# Patient Record
Sex: FEMALE | Race: WHITE | NOT HISPANIC OR LATINO | Employment: STUDENT | URBAN - METROPOLITAN AREA
[De-identification: names, ages, dates, MRNs, and addresses within clinical notes are randomized per-mention and may not be internally consistent; named-entity substitution may affect disease eponyms.]

---
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
## 2025-05-15 ENCOUNTER — OFFICE VISIT (OUTPATIENT)
Dept: OBSTETRICS AND GYNECOLOGY | Facility: CLINIC | Age: 20
End: 2025-05-15
Payer: COMMERCIAL

## 2025-05-15 VITALS — BODY MASS INDEX: 22.53 KG/M2 | WEIGHT: 131.94 LBS | HEIGHT: 64 IN

## 2025-05-15 DIAGNOSIS — N92.0 MENORRHAGIA WITH REGULAR CYCLE: Primary | ICD-10-CM

## 2025-05-15 DIAGNOSIS — Z30.018 ENCOUNTER FOR INITIAL PRESCRIPTION OF OTHER CONTRACEPTIVES: ICD-10-CM

## 2025-05-15 PROCEDURE — 99213 OFFICE O/P EST LOW 20 MIN: CPT | Mod: S$GLB,,,

## 2025-05-15 PROCEDURE — 1159F MED LIST DOCD IN RCRD: CPT | Mod: CPTII,S$GLB,,

## 2025-05-15 PROCEDURE — 99999 PR PBB SHADOW E&M-EST. PATIENT-LVL III: CPT | Mod: PBBFAC,,,

## 2025-05-15 PROCEDURE — 87591 N.GONORRHOEAE DNA AMP PROB: CPT

## 2025-05-15 PROCEDURE — 3008F BODY MASS INDEX DOCD: CPT | Mod: CPTII,S$GLB,,

## 2025-05-15 PROCEDURE — 1160F RVW MEDS BY RX/DR IN RCRD: CPT | Mod: CPTII,S$GLB,,

## 2025-05-15 RX ORDER — LEVONORGESTREL/ETHINYL ESTRADIOL 2.6; 2.3 MG/1; MG/1
1 PATCH TRANSDERMAL
Qty: 4 PATCH | Refills: 3 | Status: SHIPPED | OUTPATIENT
Start: 2025-05-15 | End: 2025-06-12

## 2025-05-15 RX ORDER — LORAZEPAM 0.5 MG/1
TABLET ORAL
COMMUNITY
Start: 2025-01-14

## 2025-05-15 RX ORDER — PANTOPRAZOLE SODIUM 40 MG/1
TABLET, DELAYED RELEASE ORAL
COMMUNITY

## 2025-05-15 NOTE — PROGRESS NOTES
CC: Annual / Well Woman Exam - Adolescent     HPI:   Pt is a 19 y.o.  female who presents for routine annual exam. She uses no method of contraception currently but would like to discuss starting one today.  She does want STD screening today. She is sexually active with 1 male partner.   She reports periods occur monthly, are extremely heavy in flow, last for 6 days, and are extremely painful. Denies presence of clots, nausea vomiting, or fatigue prior to period.  Denies pain with or changes in bowel/bladder habits, headaches, or pain with sex.   Denies abnormal vaginal bleeding, vaginal dryness, abnormal vaginal discharge, vaginal itching, vaginal irritation, vaginal odor, leaking urine, burning with urination, or urinary frequency.     LMP: Patient's last menstrual period was 2025 (exact date).     Past Medical History:   Diagnosis Date    Anxiety disorder, unspecified     Depression     History of suicidal ideation     Past Surgical History:   Procedure Laterality Date    ADENOIDECTOMY      TONSILLECTOMY       Current Medications[1]    Review of patient's allergies indicates:  No Known Allergies     FH:   Breast cancer: none  Colon cancer: none  Ovarian cancer: none  Uterine cancer: none    HPV vaccine: 0/3 doses  COVID vaccine: Never    Last pap smear: Never  History of abnormal pap smears: N/A  Colonoscopy: Never  DEXA: Never  Mammogram: Never  STD history: Never  Birth control: Nothing - currently wants to discuss today.  OB history:    Tobacco use: Never    ROS:  GENERAL: Feeling well overall. Denies fever or chills.   SKIN: Denies rash or lesions.   HEAD: Denies head injury or headache.   NODES: Denies enlarged lymph nodes.   CHEST: Denies chest pain or shortness of breath.   CARDIOVASCULAR: Denies palpitations or left sided chest pain.   ABDOMEN: No abdominal pain, constipation, diarrhea, nausea, vomiting or rectal bleeding.   URINARY: No dysuria, hematuria, or burning on  urination.  REPRODUCTIVE: See HPI.   BREASTS: Denies pain, lumps, or nipple discharge.   HEMATOLOGIC: No easy bruisability or excessive bleeding.   MUSCULOSKELETAL: Denies joint pain or swelling.   NEUROLOGIC: Denies syncope or weakness.   PSYCHIATRIC: Denies depression, anxiety or mood swings.    PE:  AFFECT: Calm, alert and oriented x3. Interactive during exam.  GENERAL: Appears well-nourished, well-developed, in no acute distress.  HEAD: Normocephalic, atraumatic.  THYROID: No thyromegaly.    SKIN: Normal for race, warm, & dry. No lesions or rashes.  LUNGS: Easy and unlabored.  HEART: Regular rate.  EXTREMITIES: No cyanosis, clubbing or edema. No calf tenderness.  LYMPH NODES: No axillary or inguinal adenopathy.      Diagnosis:  1. Menorrhagia with regular cycle    2. Encounter for initial prescription of other contraceptives      Plan:   Orders Placed This Encounter    C. trachomatis/N. gonorrhoeae by AMP [facility]; Urine    POCT Urine Pregnancy    levonorgestreL-ethinyl estrad (TWIRLA) 120-30 mcg/24 hr PTWK     - GC CT urine collected.  - POCT UPT collected.  - Full discussion regarding all contraceptive options including risks and benefits of each, emphasizing LARCs with the highest efficacy and patient satisfaction, and lowest discontinuation rates. Patient opts for Twirla patches. Condoms recommended for STI protection. Use back-up protection for 7 days after initiating birth control as you can still get pregnant during this time frame. She verbalized understanding.      Patient was counseled today on the new ACS guidelines for cervical cytology screening as well as the current recommendations for breast cancer screening. She was counseled to follow up with her PCP for other routine health maintenance. Counseling session lasted approximately 10 minutes, and all her questions were answered.    Follow-up with me in 1 year for routine well woman exam; pap smears will begin at 21 years old and HPV testing will  begin at 30 years old.        ESTRADA Saldaña  [facility]          [1]   Current Outpatient Medications   Medication Sig Dispense Refill    LORazepam (ATIVAN) 0.5 MG tablet Take by mouth.      acetaminophen (TYLENOL) 650 MG TbSR Take 1 tablet (650 mg total) by mouth every 8 (eight) hours. (Patient not taking: Reported on 2023) 20 tablet 0    cetirizine (ZYRTEC) 10 MG tablet Take 0.5 tablets (5 mg total) by mouth every evening. for 7 days 4 tablet 0    fluticasone propionate (FLONASE) 50 mcg/actuation nasal spray 1 spray (50 mcg total) by Each Nostril route once daily. (Patient not taking: Reported on 2023) 15 g 0    ibuprofen (ADVIL,MOTRIN) 400 MG tablet Take 1 tablet (400 mg total) by mouth every 6 (six) hours as needed for Other (pain). (Patient not taking: Reported on 2023) 20 tablet 0    levonorgestreL-ethinyl estrad (TWIRLA) 120-30 mcg/24 hr PTWK Place 1 patch onto the skin every 7 days. 4 patch 3    omeprazole (PRILOSEC) 20 MG capsule Take 20 mg by mouth once daily.      pantoprazole (PROTONIX) 40 MG tablet SMARTSI Tablet(s) By Mouth Daily       No current facility-administered medications for this visit.

## 2025-05-17 LAB
C TRACH DNA SPEC QL NAA+PROBE: NOT DETECTED
CTGC SOURCE (OHS) ORD-325: NORMAL
N GONORRHOEA DNA UR QL NAA+PROBE: NOT DETECTED

## 2025-05-19 ENCOUNTER — RESULTS FOLLOW-UP (OUTPATIENT)
Dept: OBSTETRICS AND GYNECOLOGY | Facility: CLINIC | Age: 20
End: 2025-05-19

## 2025-06-16 DIAGNOSIS — R10.30 LOWER ABDOMINAL PAIN, UNSPECIFIED: Primary | ICD-10-CM

## 2025-06-17 ENCOUNTER — PATIENT MESSAGE (OUTPATIENT)
Dept: OBSTETRICS AND GYNECOLOGY | Facility: CLINIC | Age: 20
End: 2025-06-17
Payer: COMMERCIAL

## 2025-06-17 ENCOUNTER — TELEPHONE (OUTPATIENT)
Dept: OBSTETRICS AND GYNECOLOGY | Facility: CLINIC | Age: 20
End: 2025-06-17
Payer: COMMERCIAL

## 2025-06-17 RX ORDER — LEVONORGESTREL/ETHINYL ESTRADIOL 2.6; 2.3 MG/1; MG/1
1 PATCH TRANSDERMAL
Qty: 3 PATCH | Refills: 4 | Status: SHIPPED | OUTPATIENT
Start: 2025-06-17 | End: 2025-06-19 | Stop reason: CLARIF

## 2025-06-17 NOTE — TELEPHONE ENCOUNTER
Copied from CRM #[id number]. Topic: Medications - Medication Question  >> Jun 17, 2025  9:53 AM Brittani wrote:  Name of Who is Calling:pt mom Crystal       What is the request in detail:Pt mom is calling to ask for a pharmacy change for rx levonorgestreL-ethinyl estrad (TWIRLA) 120-30 mcg/24 hr PTWK/ it was sent over to [facility], she is asking if you can please [facility]   [id number] [place] - [address]   Phone: [phone]  Fax: [phone]      Can the clinic reply by [facility]:call       What Number to Call Back [facility] not [facility]: Alondra [phone]

## 2025-06-18 ENCOUNTER — TELEPHONE (OUTPATIENT)
Dept: OBSTETRICS AND GYNECOLOGY | Facility: CLINIC | Age: 20
End: 2025-06-18
Payer: COMMERCIAL

## 2025-06-18 NOTE — TELEPHONE ENCOUNTER
Copied from CRM #[id number]. Topic: Medications - Medication Question  >> Jun 17, 2025  4:15 PM Brittani wrote:  Name of Who is Calling:pt mom Meryl       What is the request in detail:pt mom  is calling regarding the levonorgestreL-ethinyl estrad (TWIRLA) 120-30 mcg/24 hr PTWK/ she had the [facility] today, and it is more expensive.    She is asking if you can just call in a birth control pill that will be covered by her insurance to the   [id number] - [place], [place] - [address]   Phone: [phone]  Fax: [phone]    Any questions please call elsie Sheth [phone]      Can the clinic reply by [facility]:call       What Number to Call Back if not in [facility]:elsie Sheth [phone]

## 2025-06-19 RX ORDER — DROSPIRENONE AND ETHINYL ESTRADIOL 0.02-3(28)
1 KIT ORAL DAILY
Qty: 84 TABLET | Refills: 3 | Status: SHIPPED | OUTPATIENT
Start: 2025-06-19 | End: 2026-06-19

## 2025-08-06 ENCOUNTER — OFFICE VISIT (OUTPATIENT)
Dept: GASTROENTEROLOGY | Facility: CLINIC | Age: 20
End: 2025-08-06
Payer: COMMERCIAL

## 2025-08-06 VITALS
HEIGHT: 64 IN | HEART RATE: 71 BPM | BODY MASS INDEX: 22.6 KG/M2 | DIASTOLIC BLOOD PRESSURE: 64 MMHG | SYSTOLIC BLOOD PRESSURE: 108 MMHG | WEIGHT: 132.38 LBS | OXYGEN SATURATION: 100 %

## 2025-08-06 DIAGNOSIS — R10.30 LOWER ABDOMINAL PAIN, UNSPECIFIED: ICD-10-CM

## 2025-08-06 PROCEDURE — 99999 PR PBB SHADOW E&M-EST. PATIENT-LVL III: CPT | Mod: PBBFAC,,, | Performed by: INTERNAL MEDICINE

## 2025-08-06 PROCEDURE — 1159F MED LIST DOCD IN RCRD: CPT | Mod: CPTII,S$GLB,, | Performed by: INTERNAL MEDICINE

## 2025-08-06 PROCEDURE — 3078F DIAST BP <80 MM HG: CPT | Mod: CPTII,S$GLB,, | Performed by: INTERNAL MEDICINE

## 2025-08-06 PROCEDURE — 99204 OFFICE O/P NEW MOD 45 MIN: CPT | Mod: S$GLB,,, | Performed by: INTERNAL MEDICINE

## 2025-08-06 PROCEDURE — 3074F SYST BP LT 130 MM HG: CPT | Mod: CPTII,S$GLB,, | Performed by: INTERNAL MEDICINE

## 2025-08-06 PROCEDURE — 3008F BODY MASS INDEX DOCD: CPT | Mod: CPTII,S$GLB,, | Performed by: INTERNAL MEDICINE

## 2025-08-06 RX ORDER — FLUTICASONE PROPIONATE 50 MCG
1 SPRAY, SUSPENSION (ML) NASAL
COMMUNITY

## 2025-08-06 RX ORDER — DICYCLOMINE HYDROCHLORIDE 10 MG/1
10 CAPSULE ORAL 4 TIMES DAILY PRN
Qty: 120 CAPSULE | Refills: 3 | Status: SHIPPED | OUTPATIENT
Start: 2025-08-06

## 2025-08-06 NOTE — PROGRESS NOTES
Subjective:       Patient ID: Lidia Juan is a 19 y.o. female.    Chief Complaint: Abdominal Pain, Constipation, Gastroesophageal Reflux, and Bloated    Patient here today to establish care with aforementioned complaints    Abd pain since pre-teen. Extensive w/u with allergy testing, trial of ppi, us, all unremarkable. Endoscopy was discussed however never performed.     Doing a little better with spicy foods now. Generally occurs immediately after eating    Occ constipation.       Past Medical History:   Diagnosis Date    Anxiety disorder, unspecified     Depression     History of suicidal ideation       Past Surgical History:   Procedure Laterality Date    ADENOIDECTOMY      TONSILLECTOMY         Social History  Social History[1]    Family History   Problem Relation Name Age of Onset    Breast cancer Neg Hx      Colon cancer Neg Hx      Ovarian cancer Neg Hx         Review of Systems   Gastrointestinal:  Positive for abdominal pain and constipation.           Objective:      Physical Exam  Constitutional:       Appearance: She is well-developed.   HENT:      Head: Normocephalic and atraumatic.   Eyes:      Conjunctiva/sclera: Conjunctivae normal.   Pulmonary:      Effort: Pulmonary effort is normal. No respiratory distress.   Musculoskeletal:      Cervical back: Normal range of motion.   Neurological:      Mental Status: She is alert and oriented to person, place, and time.   Psychiatric:         Behavior: Behavior normal.         Thought Content: Thought content normal.         Judgment: Judgment normal.           Pertinent labs and imaging studies reviewed    Assessment:       1. Lower abdominal pain, unspecified        Plan:       Uncertain cause of postprandial pain.  Workup by there is account to date unremarkable  Offered endoscopy however was deferred by patient  Place on trial of Bentyl   Consider Metamucil to help normalize bowel movements  Will readdress EGD if symptoms persist    (Portions of this  note were dictated using voice recognition software and may contain dictation related errors in spelling/grammar/syntax not found on text review)             [1]   Social History  Tobacco Use    Smoking status: Never    Smokeless tobacco: Never   Substance Use Topics    Alcohol use: Never    Drug use: Never

## 2025-08-14 ENCOUNTER — TELEPHONE (OUTPATIENT)
Dept: OBSTETRICS AND GYNECOLOGY | Facility: CLINIC | Age: 20
End: 2025-08-14
Payer: COMMERCIAL